# Patient Record
Sex: FEMALE | Race: BLACK OR AFRICAN AMERICAN | NOT HISPANIC OR LATINO | URBAN - METROPOLITAN AREA
[De-identification: names, ages, dates, MRNs, and addresses within clinical notes are randomized per-mention and may not be internally consistent; named-entity substitution may affect disease eponyms.]

---

## 2023-04-18 ENCOUNTER — OFFICE VISIT (OUTPATIENT)
Dept: PRIMARY CARE | Facility: CLINIC | Age: 33
End: 2023-04-18
Payer: COMMERCIAL

## 2023-04-18 VITALS
BODY MASS INDEX: 29.73 KG/M2 | WEIGHT: 185 LBS | OXYGEN SATURATION: 97 % | HEIGHT: 66 IN | DIASTOLIC BLOOD PRESSURE: 85 MMHG | SYSTOLIC BLOOD PRESSURE: 125 MMHG | HEART RATE: 82 BPM

## 2023-04-18 DIAGNOSIS — Z13.1 SCREENING FOR DIABETES MELLITUS: ICD-10-CM

## 2023-04-18 DIAGNOSIS — Z13.220 SCREENING FOR HYPERLIPIDEMIA: ICD-10-CM

## 2023-04-18 DIAGNOSIS — J45.30 MILD PERSISTENT ASTHMA WITHOUT COMPLICATION (HHS-HCC): ICD-10-CM

## 2023-04-18 DIAGNOSIS — Z13.21 ENCOUNTER FOR VITAMIN DEFICIENCY SCREENING: ICD-10-CM

## 2023-04-18 DIAGNOSIS — Z00.00 WELL ADULT EXAM: Primary | ICD-10-CM

## 2023-04-18 PROCEDURE — 99395 PREV VISIT EST AGE 18-39: CPT | Performed by: FAMILY MEDICINE

## 2023-04-18 PROCEDURE — 1036F TOBACCO NON-USER: CPT | Performed by: FAMILY MEDICINE

## 2023-04-18 RX ORDER — ALBUTEROL SULFATE 90 UG/1
2 AEROSOL, METERED RESPIRATORY (INHALATION) EVERY 6 HOURS PRN
Qty: 18 G | Refills: 2 | Status: SHIPPED | OUTPATIENT
Start: 2023-04-18 | End: 2024-01-29 | Stop reason: SDUPTHER

## 2023-04-18 RX ORDER — IBUPROFEN 800 MG/1
1 TABLET ORAL EVERY 8 HOURS PRN
COMMUNITY

## 2023-04-18 RX ORDER — ETONOGESTREL AND ETHINYL ESTRADIOL .12; .015 MG/D; MG/D
RING VAGINAL
COMMUNITY
End: 2023-04-18 | Stop reason: SDUPTHER

## 2023-04-18 RX ORDER — ETONOGESTREL AND ETHINYL ESTRADIOL .12; .015 MG/D; MG/D
1 RING VAGINAL
Qty: 3 EACH | Refills: 3 | Status: SHIPPED | OUTPATIENT
Start: 2023-04-18 | End: 2024-01-29 | Stop reason: SDUPTHER

## 2023-04-18 RX ORDER — ALBUTEROL SULFATE 90 UG/1
AEROSOL, METERED RESPIRATORY (INHALATION)
COMMUNITY
End: 2023-04-18 | Stop reason: SDUPTHER

## 2023-04-18 ASSESSMENT — PATIENT HEALTH QUESTIONNAIRE - PHQ9
SUM OF ALL RESPONSES TO PHQ9 QUESTIONS 1 AND 2: 0
1. LITTLE INTEREST OR PLEASURE IN DOING THINGS: NOT AT ALL
2. FEELING DOWN, DEPRESSED OR HOPELESS: NOT AT ALL

## 2023-04-18 NOTE — PROGRESS NOTES
"  Subjective   Patient ID: Charles Gilmore is a 32 y.o. female who presents for Annual Exam (Annual exam, last pap 3/2020/Patient requesting new birth control rx, last one was too expensive). She loves the nuvaring though.      Past Medical, Surgical, Social and Family Hx reviewed-Yes    Any acute complaints?    No    Any chronic issues addressed today or Rx refills needed?    Her asthma is flared up again recently due to moving back to [place].  Some days she is having chest tightness and wonders if she should talk about her inhaler.  Allegra usually helps but does not every time.  She is now using the albuterol daily since the weather changed.      Last pap 3 years ago  Last Mammogram N/A  Colon CA screening Hx N/A  Labs discussed  Up to date on immunizations yes, but needs TDaP  Dentist in the past year No    Menstruation No when using the ring  Sexual Activity No        PE:  /85   Pulse 82   Ht 1.664 m (5' 5.5\")   Wt 83.9 kg (185 lb)   LMP 03/28/2023   SpO2 97%   BMI 30.32 kg/m²   Alert adult woman, NAD  HEENT clear  Neck supple, No LAD  Heart RRR no murmur  Lungs CTA bilat  Abdomen benign, normal BS, soft NT/ND  Skin warm, dry, clear  Neuro grossly normal, gait WNL  Affect pleasant and appropriate, memory and judgement WNL      Assessment/Plan   Problem List Items Addressed This Visit    None  Visit Diagnoses       Well adult exam    -  Primary    Relevant Medications    EluRyng 0.12-0.015 mg/24 hr vaginal ring    Screening for hyperlipidemia        Relevant Orders    Lipid panel    Screening for diabetes mellitus        Relevant Orders    Hemoglobin A1C    Encounter for vitamin deficiency screening        Relevant Orders    Vitamin D, Total    Mild persistent asthma without complication        Relevant Medications    albuterol 90 mcg/actuation inhaler    budesonide (Pulmicort) 180 mcg/actuation inhaler               "

## 2023-04-19 ENCOUNTER — TELEPHONE (OUTPATIENT)
Dept: PRIMARY CARE | Facility: CLINIC | Age: 33
End: 2023-04-19
Payer: COMMERCIAL

## 2023-04-19 DIAGNOSIS — J45.30 MILD PERSISTENT ASTHMA WITHOUT COMPLICATION (HHS-HCC): Primary | ICD-10-CM

## 2023-04-19 NOTE — TELEPHONE ENCOUNTER
Patient was instructed to follow-up on medication that she received from her on appointment on April 18. Says that she was informed by her pharmacy that her inhaler isn't available in the generic form. States she isn't sure if there's an issue with [employer] specifically or if her PCP can off another alternative?

## 2023-04-20 NOTE — TELEPHONE ENCOUNTER
Called the patient to discuss which inhaler she needs. States she has her normal one for ProAir and will need the other one (PULMACORT?)

## 2023-04-21 RX ORDER — MOMETASONE FUROATE 220 UG/1
220 INHALANT RESPIRATORY (INHALATION) DAILY
Qty: 1 EACH | Refills: 2 | Status: SHIPPED | OUTPATIENT
Start: 2023-04-21 | End: 2024-01-29

## 2023-04-21 NOTE — TELEPHONE ENCOUNTER
Sent a new inhaler, if this one is not covered, she should contact her insurance to find out what alternatives might e covered.  Tisha Arenas MD

## 2023-05-02 PROBLEM — G44.201 INTRACTABLE TENSION-TYPE HEADACHE: Status: RESOLVED | Noted: 2023-05-02 | Resolved: 2023-05-02

## 2023-05-02 PROBLEM — N94.6 DYSMENORRHEA: Status: RESOLVED | Noted: 2023-05-02 | Resolved: 2023-05-02

## 2023-05-02 PROBLEM — J45.20 MILD INTERMITTENT ASTHMA (HHS-HCC): Status: ACTIVE | Noted: 2023-05-02

## 2023-05-05 ENCOUNTER — LAB (OUTPATIENT)
Dept: LAB | Facility: LAB | Age: 33
End: 2023-05-05
Payer: COMMERCIAL

## 2023-05-05 DIAGNOSIS — Z13.1 SCREENING FOR DIABETES MELLITUS: ICD-10-CM

## 2023-05-05 DIAGNOSIS — Z13.21 ENCOUNTER FOR VITAMIN DEFICIENCY SCREENING: ICD-10-CM

## 2023-05-05 DIAGNOSIS — Z13.220 SCREENING FOR HYPERLIPIDEMIA: ICD-10-CM

## 2023-05-05 LAB
CALCIDIOL (25 OH VITAMIN D3) (NG/ML) IN SER/PLAS: 29 NG/ML
CHOLESTEROL (MG/DL) IN SER/PLAS: 125 MG/DL (ref 0–199)
CHOLESTEROL IN HDL (MG/DL) IN SER/PLAS: 64.5 MG/DL
CHOLESTEROL/HDL RATIO: 1.9
ESTIMATED AVERAGE GLUCOSE FOR HBA1C: 77 MG/DL
HEMOGLOBIN A1C/HEMOGLOBIN TOTAL IN BLOOD: 4.3 %
LDL: 49 MG/DL (ref 0–99)
TRIGLYCERIDE (MG/DL) IN SER/PLAS: 56 MG/DL (ref 0–149)
VLDL: 11 MG/DL (ref 0–40)

## 2023-05-05 PROCEDURE — 80061 LIPID PANEL: CPT

## 2023-05-05 PROCEDURE — 36415 COLL VENOUS BLD VENIPUNCTURE: CPT

## 2023-05-05 PROCEDURE — 83036 HEMOGLOBIN GLYCOSYLATED A1C: CPT

## 2023-05-05 PROCEDURE — 82306 VITAMIN D 25 HYDROXY: CPT

## 2024-01-29 ENCOUNTER — OFFICE VISIT (OUTPATIENT)
Dept: PRIMARY CARE | Facility: CLINIC | Age: 34
End: 2024-01-29
Payer: COMMERCIAL

## 2024-01-29 VITALS
HEIGHT: 66 IN | DIASTOLIC BLOOD PRESSURE: 72 MMHG | BODY MASS INDEX: 32.62 KG/M2 | HEART RATE: 84 BPM | OXYGEN SATURATION: 98 % | WEIGHT: 203 LBS | SYSTOLIC BLOOD PRESSURE: 112 MMHG

## 2024-01-29 DIAGNOSIS — Z00.00 WELL ADULT EXAM: Primary | ICD-10-CM

## 2024-01-29 DIAGNOSIS — Z91.018 MULTIPLE FOOD ALLERGIES: ICD-10-CM

## 2024-01-29 DIAGNOSIS — J45.30 MILD PERSISTENT ASTHMA WITHOUT COMPLICATION (HHS-HCC): ICD-10-CM

## 2024-01-29 PROCEDURE — 99395 PREV VISIT EST AGE 18-39: CPT | Performed by: FAMILY MEDICINE

## 2024-01-29 PROCEDURE — 1036F TOBACCO NON-USER: CPT | Performed by: FAMILY MEDICINE

## 2024-01-29 PROCEDURE — 99212 OFFICE O/P EST SF 10 MIN: CPT | Performed by: FAMILY MEDICINE

## 2024-01-29 RX ORDER — ALBUTEROL SULFATE 90 UG/1
2 AEROSOL, METERED RESPIRATORY (INHALATION) EVERY 6 HOURS PRN
Qty: 18 G | Refills: 2 | Status: SHIPPED | OUTPATIENT
Start: 2024-01-29

## 2024-01-29 RX ORDER — BECLOMETHASONE DIPROPIONATE HFA 80 UG/1
160 AEROSOL, METERED RESPIRATORY (INHALATION) 2 TIMES DAILY
Qty: 10.6 G | Refills: 11 | Status: SHIPPED | OUTPATIENT
Start: 2024-01-29

## 2024-01-29 RX ORDER — ETONOGESTREL AND ETHINYL ESTRADIOL .12; .015 MG/D; MG/D
RING VAGINAL
Qty: 12 EACH | Refills: 3 | Status: SHIPPED | OUTPATIENT
Start: 2024-01-29

## 2024-01-29 RX ORDER — EPINEPHRINE 0.3 MG/.3ML
1 INJECTION SUBCUTANEOUS AS NEEDED
Qty: 2 EACH | Refills: 1 | Status: SHIPPED | OUTPATIENT
Start: 2024-01-29 | End: 2025-01-28

## 2024-01-29 ASSESSMENT — PATIENT HEALTH QUESTIONNAIRE - PHQ9
2. FEELING DOWN, DEPRESSED OR HOPELESS: NOT AT ALL
1. LITTLE INTEREST OR PLEASURE IN DOING THINGS: NOT AT ALL
SUM OF ALL RESPONSES TO PHQ9 QUESTIONS 1 AND 2: 0

## 2024-01-29 ASSESSMENT — PROMIS GLOBAL HEALTH SCALE
RATE_AVERAGE_PAIN: 0
RATE_GENERAL_HEALTH: GOOD
CARRYOUT_PHYSICAL_ACTIVITIES: COMPLETELY
EMOTIONAL_PROBLEMS: OFTEN
RATE_MENTAL_HEALTH: FAIR
RATE_PHYSICAL_HEALTH: POOR
RATE_QUALITY_OF_LIFE: GOOD
CARRYOUT_SOCIAL_ACTIVITIES: FAIR
RATE_SOCIAL_SATISFACTION: GOOD
RATE_AVERAGE_FATIGUE: MILD

## 2024-01-29 NOTE — PROGRESS NOTES
"  Subjective   Patient ID: Charles Gilmore \"Odilon" is a 33 y.o. female who presents for Annual Exam (Patient is here for annual physical. She would also like to discuss her inhaler alternatives. She would also like to discuss different birth control options and a referral for therapy. She would like an Epipen. ).  Inhaler issue is cost.  Birth control issue is mainly concerns about longterm use.        Past Medical, Surgical, Social and Family Hx reviewed-Yes    Any acute complaints?    No    Any chronic issues addressed today or Rx refills needed?        Last pap 3-4 years  Last Mammogram N/A  Colon CA screening Hx N/A  Labs 2023 were great  Up to date on immunizations no, needs to find out about TDaP  Dentist in the past year yes    Menstruation too much  Sexual Activity no concerns        PE:  /79   Pulse 84   Ht 1.681 m (5' 6.2\")   Wt 92.1 kg (203 lb)   LMP 01/29/2024   SpO2 98%   BMI 32.57 kg/m²   Alert adult woman, NAD  HEENT clear  Neck supple, No LAD  Heart RRR no murmur  Lungs CTA bilat  Abdomen benign, normal BS, soft NT/ND  Skin warm, dry, clear  Neuro grossly normal, gait WNL  Affect pleasant and appropriate, memory and judgement WNL        Assessment/Plan   Problem List Items Addressed This Visit    None  Visit Diagnoses         Codes    Well adult exam    -  Primary Z00.00    Relevant Medications    EluRyng 0.12-0.015 mg/24 hr vaginal ring    Multiple food allergies     Z91.018    Relevant Medications    EPINEPHrine (Epipen) 0.3 mg/0.3 mL injection syringe    Other Relevant Orders    Referral to Allergy    Mild persistent asthma without complication     J45.30    Relevant Medications    beclomethasone dipropionate (Qvar RediHaler) 80 mcg/actuation inhaler    albuterol 90 mcg/actuation inhaler               "

## 2024-04-04 ENCOUNTER — CONSULT (OUTPATIENT)
Dept: ALLERGY | Facility: CLINIC | Age: 34
End: 2024-04-04
Payer: COMMERCIAL

## 2024-04-04 VITALS
HEIGHT: 65 IN | HEART RATE: 73 BPM | OXYGEN SATURATION: 95 % | SYSTOLIC BLOOD PRESSURE: 128 MMHG | BODY MASS INDEX: 33.15 KG/M2 | DIASTOLIC BLOOD PRESSURE: 81 MMHG | WEIGHT: 199 LBS | TEMPERATURE: 98 F

## 2024-04-04 DIAGNOSIS — J30.81 ALLERGIC RHINITIS DUE TO ANIMAL HAIR AND DANDER: ICD-10-CM

## 2024-04-04 DIAGNOSIS — J30.1 SEASONAL ALLERGIC RHINITIS DUE TO POLLEN: Primary | ICD-10-CM

## 2024-04-04 DIAGNOSIS — H10.13 ALLERGIC CONJUNCTIVITIS OF BOTH EYES: ICD-10-CM

## 2024-04-04 DIAGNOSIS — J30.89 ALLERGIC RHINITIS DUE TO DUST MITE: ICD-10-CM

## 2024-04-04 DIAGNOSIS — J45.40 MODERATE PERSISTENT ASTHMA WITHOUT COMPLICATION (HHS-HCC): ICD-10-CM

## 2024-04-04 DIAGNOSIS — T78.1XXA ADVERSE FOOD REACTION, INITIAL ENCOUNTER: ICD-10-CM

## 2024-04-04 PROCEDURE — 99204 OFFICE O/P NEW MOD 45 MIN: CPT | Performed by: ALLERGY & IMMUNOLOGY

## 2024-04-04 PROCEDURE — 94010 BREATHING CAPACITY TEST: CPT | Performed by: ALLERGY & IMMUNOLOGY

## 2024-04-04 PROCEDURE — 95004 PERQ TESTS W/ALRGNC XTRCS: CPT | Performed by: ALLERGY & IMMUNOLOGY

## 2024-04-04 RX ORDER — OLOPATADINE HYDROCHLORIDE 2 MG/ML
1 SOLUTION/ DROPS OPHTHALMIC DAILY PRN
Qty: 2.5 ML | Refills: 5 | Status: SHIPPED | OUTPATIENT
Start: 2024-04-04 | End: 2025-04-04

## 2024-04-04 RX ORDER — MINERAL OIL
180 ENEMA (ML) RECTAL DAILY PRN
Qty: 30 TABLET | Refills: 11 | Status: SHIPPED | OUTPATIENT
Start: 2024-04-04 | End: 2025-04-04

## 2024-04-04 RX ORDER — MOMETASONE FUROATE 50 UG/1
2 SPRAY, METERED NASAL 2 TIMES DAILY
Qty: 17 G | Refills: 11 | Status: SHIPPED | OUTPATIENT
Start: 2024-04-04 | End: 2025-04-04

## 2024-04-04 ASSESSMENT — ENCOUNTER SYMPTOMS
MUSCULOSKELETAL NEGATIVE: 1
HEMATOLOGIC/LYMPHATIC NEGATIVE: 1
CARDIOVASCULAR NEGATIVE: 1
CONSTITUTIONAL NEGATIVE: 1
ALLERGIC/IMMUNOLOGIC NEGATIVE: 1
GASTROINTESTINAL NEGATIVE: 1
EYES NEGATIVE: 1
RESPIRATORY NEGATIVE: 1

## 2024-04-04 ASSESSMENT — PAIN SCALES - GENERAL: PAINLEVEL: 0-NO PAIN

## 2024-04-04 NOTE — PROGRESS NOTES
Charles Gilmore presents for initial evaluation today.      Charles Gilmore was seen at the request of Tisha Arenas MD for a chief complaint of allergies; a report with my findings is being sent via written or electronic means to Tisha Arenas MD with my recommendations for treatment    She provides the following history:  She has had seasonal allergies since being an adult. She moved from [place] to [place] and then back to [place] 2 years ago. She notes that she gets  itchy eyes, runny nose, sneezing, nasal congestion in the spring through fall and with exposure to cat and dogs.  The symptoms have been worse since moving back to [place].  The house that she lives in had animals previously.  She uses Allegra, Zyrtec, Pataday.      Asthma:  She was diagnosed with asthma at age 2.  She was on Asmanex and recently switched to Qvar due to insurance.  She rarely uses her rescue Albuterol.  She denies nocturnal symptoms.     Eczema: She reports mild eczema controlled with moisturizers    Food allergy: Avocado- used an avocado hair mask at age 17, felt like she had a chemical burn on scalp.  If she tastes avocado, her mouth itches so she avoids it.  Eating a meal cooked with avocado oil made her feel like she had chest burning; Pineapple, watermelon, kiwi, fresh tomato make her mouth itch.      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    HENT: Negative.     Eyes: Negative.    Respiratory: Negative.     Cardiovascular:  "Negative.    Gastrointestinal: Negative.    Musculoskeletal: Negative.    Skin: Negative.    Allergic/Immunologic: Negative.    Hematological: Negative.        Vital signs:  /81   Pulse 73   Temp 36.7 °C (98 °F)   Ht 1.651 m (5' 5\")   Wt 90.3 kg (199 lb)   SpO2 95%   BMI 33.12 kg/m²     Physical Exam:  GENERAL: Alert, oriented and in no acute distress.     HEENT: EYES: No conjunctival injection or cobblestoning. Nose: nasal turbinates mildly edematous and are not boggy.  There is no mucous stranding, polyps, or blood    noted. EARS: Tympanic membranes are clear. MOUTH: moist and pink with no exudates, ulcers, or thrush. NECK: is supple, without adenopathy.  No upper airway stridor noted.       HEART: regular rate and rhythm.       LUNGS: Clear to auscultation bilaterally. No wheezing, rhonchi or rales.        ABDOMEN: Positive bowel sounds, soft, nontender, nondistended.       EXTREMITIES: No clubbing or edema.        NEURO:  Normal affect.  Gait normal.      SKIN: No rash, hives, or angioedema noted      Environmental Allergy Testing:  Test Results (wheal/flare in mm)    Controls  Controls  Histamine: 8x30  Negative: 0x0    Trees:  Trees  American Beech: 4x20  Darrel: 0x0  Birch: 0x0  Black [place]: 4x20  [place]: 0x0  [place]: 0x0  [place]astern [place]: 0x0  Elm: 4x20  [place]: 8x30  Maple: 3x6  [place]: 0x0  [place]: 3x20  [place]: 3x25  White poplar: 0x0     Grasses:  Grass  B[place]ia: 0x0  [place]: 3x25  [place]: 0x0  KORT Grass Mix: 0x0  Sweet Vernal: 0x0    Weeds:  Weeds  Cocklebur: 0x0  Dandelion: 0x0  English Plantain: 0x0  Goldenrod: 0x0  Lambs Quarters: 0x0  Mugwort: 0x0  Pigweed: 3x25  Ragweed Mix: 0x0  Russian Thistle: 0x0  Yellow Dock: 0x0     Molds:  Molds  Alternaria: 0x0  Aspergillus: 0x0  Cladosporium: 0x0  Helminthosporium: 0x0  Penicillium: 0x0  Stemphyllium: 0x0    Animals/Dust Mite:  Animals  Cat: 4x30  AP dog: 4x25  Ruelas Dog: 0x0  Mouse: 0x0  Cockroach: 0x0  Dust Mite F: 4x35  Dust Mite P: " 3x20     Food Allergy Test Results     Fruits-Other  Avocado: 0x0    Office spirometry:  FEV1: 116% predicted  FVC:  115% predicted  FEV1/FVC: 0.842  Inspiratory loops: mild flattening of inspiratory loop  Post-bronchodilator assessment: No    Impression:  1. Seasonal allergic rhinitis due to pollen    2. Adverse food reaction, initial encounter    3. Allergic rhinitis due to animal hair and dander    4. Allergic rhinitis due to dust mite    5. Moderate persistent asthma without complication      Assessment and Plan:  Allergic rhinitis, seasonal and perennial: She was found to have significant sensitivity to tree pollen, weed pollen, cat, dog, dust mite on aeroallergen skin prick testing today.  We discussed treatments for allergic rhinitis to include avoidance, medication, and allergen immunotherapy.  We discussed interventions for dust mite control, and provided  information on dust mite encasements for the bedding.  Recommend Nasonex 2 sprays each nostril once daily, and fexofenadine 180 mg once daily as needed.  We reviewed proper nasal spray administration technique.  If she does not achieve adequate control with medication and/or would like to decrease overall medication use, allergen immunotherapy would be an option to consider in the future.    Asthma, moderate persistent: Well-controlled on Qvar 2 puffs once daily and as needed albuterol.  Office spirometry today shows FEV1 116% predicted.  Continue current therapy.    Adverse reaction to food, initial encounter: Avocado allergy skin prick testing is negative today.  If has continued symptoms with exposure, could consider prick-prick skin testing with fresh avocado.  We discussed that her oral itching with certain fresh fruits and vegetables is likely due to oral allergy syndrome (pollen-food cross-reactivity).    She will be moving to [place] this summer, and may follow-up with our office as needed

## 2024-04-04 NOTE — PATIENT INSTRUCTIONS
It was nice to meet you today    Your skin testing today was positive to tree and weed pollen, cat, dog, dust mite.  Please see below for environmental allergen avoidance recommendations.     Environmental Allergy Testing:  Test Results (wheal/flare in mm)    Controls  Controls  Histamine: 8x30  Negative: 0x0    Trees:  Trees  American Beech: 4x20  Darrel: 0x0  Birch: 0x0  Black [place]: 4x20  [place]: 0x0  [place]: 0x0  Eastern [place]: 0x0  Elm: 4x20  [place]: 8x30  Maple: 3x6  [place]: 0x0  [place]: 3x20  [place]: 3x25  White poplar: 0x0     Grasses:  Grass  B[place]ia: 0x0  [place]: 3x25  [place]: 0x0  KORT Grass Mix: 0x0  Sweet Vernal: 0x0    Weeds:  Weeds  Cocklebur: 0x0  Dandelion: 0x0  English Plantain: 0x0  Goldenrod: 0x0  Lambs Quarters: 0x0  Mugwort: 0x0  Pigweed: 3x25  Ragweed Mix: 0x0  Russian Thistle: 0x0  Yellow Dock: 0x0     Molds:  Molds  Alternaria: 0x0  Aspergillus: 0x0  Cladosporium: 0x0  Helminthosporium: 0x0  Penicillium: 0x0  Stemphyllium: 0x0    Animals/Dust Mite:  Animals  Cat: 4x30  AP dog: 4x25  Ruelas Dog: 0x0  Mouse: 0x0  Cockroach: 0x0  Dust Mite F: 4x35  Dust Mite P: 3x20      You may use Nasonex (mometasone) 2 sprays each nostril once daily    Technique for nasal spray administration:  First, look slightly down, breathe normally, you do not need to sniff while you spray.  To use the nose spray, place the tip in your nose with the opposite hand (left hand, right side of nose, right hand, left side of nose), and aim or point toward the outside of the nose.  Do not sniff or snort medication afterwards as this can cause most of the medication to be swallowed.  Rather, dab your nose with a tissue if any runs out.    Start Allegra (Fexofenadine) 180 mg once daily      You may use olopatadine 0.2% eyedrops 1 drop each eye daily as needed      Thank you for your visit to the [facility].  Part of a successful visit is taking home the  information you learned today and using it to make things better.  These take home instructions are to help you, if you have questions or concerns, please contact us.     Please see below for recommendations on environmental allergy control or modification:     Pollen Avoidance--If you are sensitive to pollen, there are a few tips to help limit, but          not avoid, exposure.     Minimize outdoor activity between 5am-10am, pollen levels are highest at this time.       Keep car windows closed when traveling.     Close house windows at night, use the air conditioning if necessary.     Check the pollen count to know what pollen is outside (http://aaaai.org/nab).       , Pet Avoidance     Keep pets out of the bedroom at all times.     Keep pets off of furniture and other surfaces like counter tops.     More frequent bathing can help      Groom your pet outside so hair and dander stay outside the home when brushed.     Consider using HEPA air purifier in bedroom    , and Dust mite control.            1.  Dust mite proof covers/encasing on the mattress, pillow and box spring.            2.  Wash sheets and bed linens in hot water each week.          3.  Vacuum carpets in bedroom each week.          4.  Dust or wipe down any hard surfaces.          5.  Avoid using a humidifier in the bedroom      Limit Cigarette smoke exposure.  Smoking and second hand smoke can irritate the          nose and sinuses.  You and the people around you will benefit from avoiding          cigarette smoke.

## 2024-09-08 ENCOUNTER — HOSPITAL ENCOUNTER (EMERGENCY)
Facility: HOSPITAL | Age: 34
Discharge: HOME | End: 2024-09-08
Attending: EMERGENCY MEDICINE
Payer: COMMERCIAL

## 2024-09-08 ENCOUNTER — APPOINTMENT (OUTPATIENT)
Dept: RADIOLOGY | Facility: HOSPITAL | Age: 34
End: 2024-09-08
Payer: COMMERCIAL

## 2024-09-08 VITALS
HEIGHT: 67 IN | RESPIRATION RATE: 18 BRPM | WEIGHT: 198 LBS | DIASTOLIC BLOOD PRESSURE: 92 MMHG | HEART RATE: 81 BPM | SYSTOLIC BLOOD PRESSURE: 143 MMHG | TEMPERATURE: 98.1 F | BODY MASS INDEX: 31.08 KG/M2 | OXYGEN SATURATION: 98 %

## 2024-09-08 DIAGNOSIS — K80.20 GALL BLADDER STONES: Primary | ICD-10-CM

## 2024-09-08 LAB
ALBUMIN SERPL BCP-MCNC: 3.8 G/DL (ref 3.4–5)
ALP SERPL-CCNC: 71 U/L (ref 33–110)
ALT SERPL W P-5'-P-CCNC: 11 U/L (ref 7–45)
ANION GAP SERPL CALC-SCNC: 10 MMOL/L (ref 10–20)
APPEARANCE UR: CLEAR
AST SERPL W P-5'-P-CCNC: 14 U/L (ref 9–39)
B-HCG SERPL-ACNC: <2 MIU/ML
BACTERIA #/AREA URNS AUTO: ABNORMAL /HPF
BASOPHILS # BLD AUTO: 0.04 X10*3/UL (ref 0–0.1)
BASOPHILS NFR BLD AUTO: 0.9 %
BILIRUB DIRECT SERPL-MCNC: 0.2 MG/DL (ref 0–0.3)
BILIRUB SERPL-MCNC: 0.9 MG/DL (ref 0–1.2)
BILIRUB UR STRIP.AUTO-MCNC: NEGATIVE MG/DL
BUN SERPL-MCNC: 11 MG/DL (ref 6–23)
CALCIUM SERPL-MCNC: 9.1 MG/DL (ref 8.6–10.3)
CHLORIDE SERPL-SCNC: 103 MMOL/L (ref 98–107)
CO2 SERPL-SCNC: 26 MMOL/L (ref 21–32)
COLOR UR: COLORLESS
CREAT SERPL-MCNC: 0.86 MG/DL (ref 0.5–1.05)
EGFRCR SERPLBLD CKD-EPI 2021: 90 ML/MIN/1.73M*2
EOSINOPHIL # BLD AUTO: 0.06 X10*3/UL (ref 0–0.7)
EOSINOPHIL NFR BLD AUTO: 1.3 %
ERYTHROCYTE [DISTWIDTH] IN BLOOD BY AUTOMATED COUNT: 12.3 % (ref 11.5–14.5)
GLUCOSE SERPL-MCNC: 82 MG/DL (ref 74–99)
GLUCOSE UR STRIP.AUTO-MCNC: NORMAL MG/DL
HCT VFR BLD AUTO: 39.8 % (ref 36–46)
HGB BLD-MCNC: 12.8 G/DL (ref 12–16)
IMM GRANULOCYTES # BLD AUTO: 0.03 X10*3/UL (ref 0–0.7)
IMM GRANULOCYTES NFR BLD AUTO: 0.6 % (ref 0–0.9)
KETONES UR STRIP.AUTO-MCNC: NEGATIVE MG/DL
LACTATE SERPL-SCNC: 0.6 MMOL/L (ref 0.4–2)
LEUKOCYTE ESTERASE UR QL STRIP.AUTO: ABNORMAL
LYMPHOCYTES # BLD AUTO: 1.72 X10*3/UL (ref 1.2–4.8)
LYMPHOCYTES NFR BLD AUTO: 37 %
MCH RBC QN AUTO: 27.1 PG (ref 26–34)
MCHC RBC AUTO-ENTMCNC: 32.2 G/DL (ref 32–36)
MCV RBC AUTO: 84 FL (ref 80–100)
MONOCYTES # BLD AUTO: 0.26 X10*3/UL (ref 0.1–1)
MONOCYTES NFR BLD AUTO: 5.6 %
NEUTROPHILS # BLD AUTO: 2.54 X10*3/UL (ref 1.2–7.7)
NEUTROPHILS NFR BLD AUTO: 54.6 %
NITRITE UR QL STRIP.AUTO: NEGATIVE
NRBC BLD-RTO: 0 /100 WBCS (ref 0–0)
PH UR STRIP.AUTO: 6.5 [PH]
PLATELET # BLD AUTO: 223 X10*3/UL (ref 150–450)
POTASSIUM SERPL-SCNC: 3.8 MMOL/L (ref 3.5–5.3)
PROT SERPL-MCNC: 6.8 G/DL (ref 6.4–8.2)
PROT UR STRIP.AUTO-MCNC: NEGATIVE MG/DL
RBC # BLD AUTO: 4.73 X10*6/UL (ref 4–5.2)
RBC # UR STRIP.AUTO: NEGATIVE /UL
RBC #/AREA URNS AUTO: ABNORMAL /HPF
SODIUM SERPL-SCNC: 135 MMOL/L (ref 136–145)
SP GR UR STRIP.AUTO: 1.01
SQUAMOUS #/AREA URNS AUTO: ABNORMAL /HPF
UROBILINOGEN UR STRIP.AUTO-MCNC: NORMAL MG/DL
WBC # BLD AUTO: 4.7 X10*3/UL (ref 4.4–11.3)
WBC #/AREA URNS AUTO: ABNORMAL /HPF

## 2024-09-08 PROCEDURE — 74177 CT ABD & PELVIS W/CONTRAST: CPT | Performed by: STUDENT IN AN ORGANIZED HEALTH CARE EDUCATION/TRAINING PROGRAM

## 2024-09-08 PROCEDURE — 96361 HYDRATE IV INFUSION ADD-ON: CPT

## 2024-09-08 PROCEDURE — 36415 COLL VENOUS BLD VENIPUNCTURE: CPT | Performed by: EMERGENCY MEDICINE

## 2024-09-08 PROCEDURE — 82248 BILIRUBIN DIRECT: CPT | Performed by: EMERGENCY MEDICINE

## 2024-09-08 PROCEDURE — 99284 EMERGENCY DEPT VISIT MOD MDM: CPT | Mod: 25

## 2024-09-08 PROCEDURE — 87086 URINE CULTURE/COLONY COUNT: CPT | Mod: AHULAB | Performed by: EMERGENCY MEDICINE

## 2024-09-08 PROCEDURE — 96374 THER/PROPH/DIAG INJ IV PUSH: CPT

## 2024-09-08 PROCEDURE — 83605 ASSAY OF LACTIC ACID: CPT | Performed by: EMERGENCY MEDICINE

## 2024-09-08 PROCEDURE — 81001 URINALYSIS AUTO W/SCOPE: CPT | Performed by: EMERGENCY MEDICINE

## 2024-09-08 PROCEDURE — 2500000004 HC RX 250 GENERAL PHARMACY W/ HCPCS (ALT 636 FOR OP/ED): Performed by: EMERGENCY MEDICINE

## 2024-09-08 PROCEDURE — 74177 CT ABD & PELVIS W/CONTRAST: CPT

## 2024-09-08 PROCEDURE — 85025 COMPLETE CBC W/AUTO DIFF WBC: CPT | Performed by: EMERGENCY MEDICINE

## 2024-09-08 PROCEDURE — 96375 TX/PRO/DX INJ NEW DRUG ADDON: CPT

## 2024-09-08 PROCEDURE — 80048 BASIC METABOLIC PNL TOTAL CA: CPT | Performed by: EMERGENCY MEDICINE

## 2024-09-08 PROCEDURE — 84702 CHORIONIC GONADOTROPIN TEST: CPT | Performed by: EMERGENCY MEDICINE

## 2024-09-08 PROCEDURE — 2550000001 HC RX 255 CONTRASTS: Performed by: EMERGENCY MEDICINE

## 2024-09-08 RX ORDER — KETOROLAC TROMETHAMINE 30 MG/ML
30 INJECTION, SOLUTION INTRAMUSCULAR; INTRAVENOUS ONCE
Status: COMPLETED | OUTPATIENT
Start: 2024-09-08 | End: 2024-09-08

## 2024-09-08 RX ORDER — ONDANSETRON HYDROCHLORIDE 2 MG/ML
4 INJECTION, SOLUTION INTRAVENOUS ONCE
Status: COMPLETED | OUTPATIENT
Start: 2024-09-08 | End: 2024-09-08

## 2024-09-08 ASSESSMENT — COLUMBIA-SUICIDE SEVERITY RATING SCALE - C-SSRS
1. IN THE PAST MONTH, HAVE YOU WISHED YOU WERE DEAD OR WISHED YOU COULD GO TO SLEEP AND NOT WAKE UP?: NO
6. HAVE YOU EVER DONE ANYTHING, STARTED TO DO ANYTHING, OR PREPARED TO DO ANYTHING TO END YOUR LIFE?: NO
2. HAVE YOU ACTUALLY HAD ANY THOUGHTS OF KILLING YOURSELF?: NO

## 2024-09-08 NOTE — ED TRIAGE NOTES
PT TO ED WITH C/O ABD PAIN/ STATES IT STARTS UNDER RIGHT RIB AND RADIATES DOWN TO LOWER ABD. PT HAD APPENDIX REMOVED. STATES ABD PAIN STARTED AT 3PM AND IT SUBSIDED BUT CAME BACK AN HOUR LATER. DENIES CP/SOB

## 2024-09-09 NOTE — ED PROVIDER NOTES
HPI   Chief Complaint   Patient presents with    Abdominal Pain       HPI: []  33-year-old female no medical history status post appendectomy comes with abdominal discomfort.  He states that for the last 2 days he developed dull pain.  Which is localized right upper quadrant and lower abdomen.  Comes and goes.  She has associated nausea no vomiting no diarrhea constipation no fever no chills no back pain no flank pain no urinary frequency urgency hematuria.  No recent travel hospitalization.  No vaginal spotting bleeding or discharge.  Not pregnant.    Past history: Appendectomy  Social: Patient denies current tobacco alcohol drug abuse.  REVIEW OF SYSTEMS:    GENERAL.: No weight loss, fatigue, anorexia, insomnia, fever.    EYES: No vision loss, double vision, drainage, eye pain.    ENT: No pharyngitis, dry mouth.    CARDIOPULMONARY: No chest pain, palpitations, syncope, near syncope. No shortness of breath, cough, hemoptysis.    GI: Positive for abdominal pain, change in bowel habits, melena, hematemesis, hematochezia, nausea, vomiting, diarrhea.    : No discharge, dysuria, frequency, urgency, hematuria.    MS: No limb pain, joint pain, joint swelling.    SKIN: No rashes.    PSYCH: No depression, anxiety, suicidality, homicidality.    Review of systems is otherwise negative unless stated above or in history of present illness.  Social history, family history, allergies reviewed.  PHYSICAL EXAM:    GENERAL: Vitals noted, no distress. Alert and oriented  x 3. Non-toxic.      EENT: TMs clear. Posterior oropharynx unremarkable. No meningismus. No LAD.     NECK: Supple. Nontender. No midline tenderness.     CARDIAC: Regular, rate, rhythm. No murmurs rubs or gallops. No JVD    PULMONARY: Lungs clear bilaterally with good aeration. No wheezes rales or rhonchi. No respiratory distress.     ABDOMEN: Soft, nonsurgical.  Tender right upper quadrant no rebound or guarding negative CVA tenderness negative inguinal hernias. No  peritoneal signs. Normoactive bowel sounds. No pulsatile masses.     EXTREMITIES: No peripheral edema. Negative Homans bilaterally, no cords.  2+ bounding pulses well-perfused.    SKIN: No rash. Intact.     NEURO: No focal neurologic deficits, NIH score of 0. Cranial nerves normal as tested from II through XII.     MEDICAL DECISION MAKING:  CBC with differential chemistries liver functions lactate urinalysis negative pregnancy negative.  Abdominal CAT scan shows a stones in the gallbladder no cholecystitis or choledocholithiasis, and mild inflammation terminal ileum.    Treatment in the ED: IV established given IV fluids intravenous ketorolac and Zofran.    ED course: Patient made aware of the abnormal findings on repeat exam she has a very benign abdominal examination with no signs of cholecystitis    Impression: #1 abdominal pain, #2 gallbladder disease, #3 terminal ileitis    Plan/MDM: 33-year-old female status post appendectomy comes in with what appears to be biliary colic with possible terminal ileitis leukocytosis choledocholithiasis or cholecystitis or colitis, patient currently is symptom-free will be discharged home has no fever no leukocytosis normal liver functions advised outpatient follow-up with primary doctor and general surgery for further evaluation and care with strict and precaution.              Patient History   Past Medical History:   Diagnosis Date    Dysmenorrhea 05/02/2023     Past Surgical History:   Procedure Laterality Date    OTHER SURGICAL HISTORY  01/25/2022    Dermatological surgery     Family History   Problem Relation Name Age of Onset    Hypertension Mother      No Known Problems Father      No Known Problems Sister      No Known Problems Sister      No Known Problems Brother      No Known Problems Son       Social History     Tobacco Use    Smoking status: Never    Smokeless tobacco: Never   Substance Use Topics    Alcohol use: Yes    Drug use: Not Currently       Physical Exam    ED Triage Vitals [09/08/24 1741]   Temperature Heart Rate Respirations BP   36.7 °C (98.1 °F) 81 18 147/85      Pulse Ox Temp src Heart Rate Source Patient Position   100 % -- -- --      BP Location FiO2 (%)     -- --       Physical Exam      ED Course & [facility]   ED Course as of 09/08/24 2102   Sun Sep 08, 2024 2100 Patient's laboratory workup is fairly reassuring CBC with differential chemistry liver functions lactate is normal.  Abdominal CAT scan shows a biliary gallbladder disease/stones and mild inflammation terminal ileum on exam patient has no pain or tenderness in the Periumbilical or right lower quadrant.,  Patient may have the abnormal findings of the CAT scan including the gallstone patient patient currently had no signs of cholecystitis or pancreatitis or choledocholithiasis normal LFTs will be discharged home with an outpatient follow with her primary doctor and general surgery with strict return precaution. [MT]      ED Course User Index  [MT] Gregg Christensen MD         Diagnoses as of 09/08/24 2102   Gall bladder stones                 No data recorded     [place] Coma Scale Score: 15 (09/08/24 1800 : Stephanie Ortiz, HEATHER)                           Medical Decision Making      Procedure  Procedures     Gregg Christensen MD  09/08/24 2104

## 2024-09-10 LAB — BACTERIA UR CULT: NO GROWTH

## 2024-09-10 NOTE — PROGRESS NOTES
Charles Gilmore  [id number]   09/11/24  10:20 AM    HPI/Subjective:  Charles Gilmore is a 33 y.o. female with history of asthma, appendectomy who is referred to clinic by Gregg Christensen MD for evaluation of possible symptomatic gallstones.    Symptomatically, this patient experiences lower abdominal pain - first noticed last month happening a couple times a week and now seems more frequent over the last couple of days. Will come and go and last a few minutes and typically go away. Primarily lower abdominal pain. Does not radiate to back or shoulder. Maybe sometimes umbilicus. No fevers or chills. Is a vegetarian, has bowel movements with intervals of 2-4 days between. No blood. Usually well formed. In general doesn't have significant pain with periods, and doesn't generally feel like this. However, does note that this feels like period cramps that she had prior to having her son and being on birth control.    They have had  one  presentation(s) to the hospital for these symptoms. She presented because the pain lasted longer than normal. It was primarily lower abdominal, but a little RUQ and chest. Had some nausea, but no change in bowel habits. This was the only time she's had nausea, which she felt was secondary to pain.     Workup has included labs notable for normal WBC, normal bilirubin, normal alk phos, and unchecked lipase, and CT Scan: notable for mild intrahepatic dilation, cholelithiasis, no overt cholecystitis . They also noted possible terminal ileal thickening and left sided ovarian vein prominence with question of pelvic congestion.    Past surgical history is otherwise notable for lap appendectomy. Not sure of any family history of IBD.     Review of systems is as above, otherwise negative.    Current Outpatient Medications   Medication Instructions    albuterol 90 mcg/actuation inhaler 2 puffs, inhalation, Every 6 hours PRN    beclomethasone dipropionate (Qvar RediHaler) 80 mcg/actuation inhaler 2  Inhalations, inhalation, 2 times daily, Rinse mouth with water after use to reduce aftertaste and incidence of candidiasis. Do not swallow.    EluRyng 0.12-0.015 mg/24 hr vaginal ring Insert vaginally and leave in place for 3 consecutive weeks, Change every 3 weeks and leave out for 7 days every 12 weeks.    EPINEPHrine (EPIPEN) 0.3 mg, intramuscular, As needed, Call 911 after use.    fexofenadine (ALLEGRA) 180 mg, oral, Daily PRN    ibuprofen 800 mg tablet 1 tablet, oral, Every 8 hours PRN    mometasone (Nasonex) 50 mcg/actuation nasal spray 2 sprays, Each Nostril, 2 times daily    olopatadine (Pataday) 0.2 % ophthalmic solution 1 drop, ophthalmic (eye), Daily PRN     Allergies   Allergen Reactions    Avocado Hives     Skin burning after she put it on her hair    Pineapple Other    Watermelon Other       Objective:  Vitals:    09/11/24 1003   BP: 110/70   Pulse: 87   Resp: 16     Body mass index is 30.7 kg/m².  Physical Exam  Constitutional:       Appearance: Normal appearance.   HENT:      Head: Normocephalic and atraumatic.      Mouth/Throat:      Mouth: Mucous membranes are moist.   Eyes:      Extraocular Movements: Extraocular movements intact.      Pupils: Pupils are equal, round, and reactive to light.   Cardiovascular:      Rate and Rhythm: Normal rate and regular rhythm.   Pulmonary:      Effort: Pulmonary effort is normal.   Abdominal:      General: Abdomen is flat.      Palpations: Abdomen is soft.      Tenderness: There is abdominal tenderness.      Comments: Mild tenderness in lower abdomen and epigastrum   Musculoskeletal:         General: Normal range of motion.      Cervical back: Normal range of motion.   Skin:     General: Skin is warm and dry.   Neurological:      General: No focal deficit present.      Mental Status: She is alert and oriented to person, place, and time.   Psychiatric:         Mood and Affect: Mood normal.         Behavior: Behavior normal.         Thought Content: Thought content  normal.         Imaging consisting of CT abdomen pelvis from 9/8 was reviewed personally and was notable for gallstones and intrahepatic biliary dilatation .    Official radiology read:  IMPRESSION:  1. Mild prominence of the intrahepatic biliary tree with several  radiolucent stones visualized in the gallbladder, without evidence of  gallbladder wall thickening, pericholecystic stranding or dilatation  of the common bile duct. Correlate with biliary colic and ultrasound  can be considered for further characterization.  2. Slight mucosal prominence in the terminal ileum without bobbi  inflammatory wall thickening or surrounding mesenteric stranding.  Appendix is surgically absent.  3. Prominent asymmetric veins are present in the left adnexa  surrounding the left ovary, with mild asymmetric enlargement of the  left gonadal vein more proximally, without evidence of obstruction or  thrombus. Finding is of unclear clinical etiology and correlate with  any pelvic congestion symptoms.    Assessment/Plan:  Charles Gilmore is a 33 y.o. female with history of asthma, lap appendectomy who presents with symptoms predominantly of lower abdominal pain over the last month and workup demonstrating gallstones without gallbladder inflammation, terminal ileal mucosal prominence, and prominent asymmetric left ovarian veins.     We will plan to refer the patient for assessment by GI and gynecology . Her symptoms as reported at this visit are not typical for symptomatic gallstones and she does have abnormal imaging findings that could suggest IBD or pelvic congestion as the etiology of her symptoms which is primarily transient lower abdominal pain. We discussed options of performing a cholecystectomy vs referral to other specialists for evaluation first, and because these symptoms are not classic for biliary colic and there are other disease processes on the differential suggested by imaging findings, I think it would be reasonable to  undergo evaluation by other specialists first.    If she undergoes evaluation that rules out alternative etiologies of her pain, I would offer cholecystectomy as a diagnostic and potentially therapeutic option. I also discussed with her that if her symptoms become more classic of biliary colic over time even prior to specialist evaluation, I would offer cholecystectomy in that scenario as well.    I will see the patient back in the office in about 2 months for symptom reevaluation and follow up of specialist evaluation or sooner if needed.    Thuan Sanchez MD  Assistant Professor of Surgery  Division of General Surgery  Department of Surgery

## 2024-09-11 ENCOUNTER — APPOINTMENT (OUTPATIENT)
Dept: SURGERY | Facility: CLINIC | Age: 34
End: 2024-09-11
Payer: COMMERCIAL

## 2024-09-11 VITALS
SYSTOLIC BLOOD PRESSURE: 110 MMHG | HEART RATE: 87 BPM | DIASTOLIC BLOOD PRESSURE: 70 MMHG | BODY MASS INDEX: 30.76 KG/M2 | RESPIRATION RATE: 16 BRPM | HEIGHT: 67 IN | WEIGHT: 196 LBS

## 2024-09-11 DIAGNOSIS — R10.30 LOWER ABDOMINAL PAIN OF UNKNOWN ETIOLOGY: Primary | ICD-10-CM

## 2024-09-11 DIAGNOSIS — N94.89 PELVIC CONGESTION: ICD-10-CM

## 2024-09-11 DIAGNOSIS — R93.3 ABNORMAL COMPUTED TOMOGRAPHY OF CECUM AND TERMINAL ILEUM: ICD-10-CM

## 2024-09-11 DIAGNOSIS — K80.20 GALL BLADDER STONES: ICD-10-CM

## 2024-09-11 PROCEDURE — 3008F BODY MASS INDEX DOCD: CPT | Performed by: SURGERY

## 2024-09-11 PROCEDURE — 99204 OFFICE O/P NEW MOD 45 MIN: CPT | Performed by: SURGERY

## 2024-09-11 ASSESSMENT — PAIN SCALES - GENERAL: PAINLEVEL: 0-NO PAIN

## 2024-09-11 NOTE — Clinical Note
Hi there,  I'm a new general surgeon at . I saw Ms. Gilmore today. She's been having intermittent predominantly lower abdominal pain for the last month or so. She had gone to the ED and was found to have gallstones (for which she was sent to me), but also had some mucosal enhancement of her terminal ileum and some suggestion of possible pelvic congestion. We talked about options including lap [place] but because her symptoms are atypical of gallstone disease, I'm going to refer to GI and GYN before doing anything else. If their evaluation is negative and/or her symptoms become more classic of gallstone disease, I'd be happy to take her gallbladder out. Just wanted to keep you in the loop.  Please let me know if I can be of any other help to you or this patient. I specialize in complex hernia repair and would be happy to see any other patients you have that might have hernias. Thank you!  Frederick

## 2024-09-26 ENCOUNTER — APPOINTMENT (OUTPATIENT)
Dept: OBSTETRICS AND GYNECOLOGY | Facility: CLINIC | Age: 34
End: 2024-09-26
Payer: COMMERCIAL

## 2024-10-03 ENCOUNTER — OFFICE VISIT (OUTPATIENT)
Dept: GASTROENTEROLOGY | Facility: CLINIC | Age: 34
End: 2024-10-03
Payer: COMMERCIAL

## 2024-10-03 VITALS
HEART RATE: 71 BPM | DIASTOLIC BLOOD PRESSURE: 70 MMHG | HEIGHT: 67 IN | BODY MASS INDEX: 30.29 KG/M2 | TEMPERATURE: 98.2 F | WEIGHT: 193 LBS | SYSTOLIC BLOOD PRESSURE: 124 MMHG

## 2024-10-03 DIAGNOSIS — R93.3 ABNORMAL COMPUTED TOMOGRAPHY OF CECUM AND TERMINAL ILEUM: ICD-10-CM

## 2024-10-03 DIAGNOSIS — Z83.79 FAMILY HISTORY OF CROHN'S DISEASE: ICD-10-CM

## 2024-10-03 DIAGNOSIS — R10.30 LOWER ABDOMINAL PAIN OF UNKNOWN ETIOLOGY: Primary | ICD-10-CM

## 2024-10-03 PROCEDURE — 99204 OFFICE O/P NEW MOD 45 MIN: CPT | Performed by: NURSE PRACTITIONER

## 2024-10-03 PROCEDURE — 3008F BODY MASS INDEX DOCD: CPT | Performed by: NURSE PRACTITIONER

## 2024-10-03 PROCEDURE — 99214 OFFICE O/P EST MOD 30 MIN: CPT | Performed by: NURSE PRACTITIONER

## 2024-10-03 NOTE — PROGRESS NOTES
Charles Gilmore is a 33 y.o. female with past medical history of asthma who is referred by Dr. Thuan Sanchez for lower abdominal pain. She had appendectomy 2023. Path showed acute appendicitis with serositis.     Now reports PLQ abdominal pain for several months, now more frequent. Occurring off an on, 3-5 times per week. Nausea but no vomiting. No changes in bowels. Tends towards constipation since childhood. Typically has BM 1-2 times per week. No blood in stool. Some bloating. Weight stable.    On 9/8/2024 presented to ER because pain was worse it has been, felt similar to prior appendicitis. No anemia. CT showed numerous gallstones, mild prominence of biliary tree. Slight prominence of terminal ileum without bowel wall thickening. Also some pelvic congestion, but worse on left side. No NSAIDS. No prior EGD or colonoscopy.    Social history: Works nightshift at [employer] and getting Bachelor's degree in [employer]. Never tobacco. Rare alcohol. Denies illicit drug use.    Family history: Maternal grandmother had colitis or Crohn's disease, unsure exact diagnosis. Denies family history of colon cancer or other GI disorders or malignancy.    Current Outpatient Medications   Medication Sig Dispense Refill    albuterol 90 mcg/actuation inhaler Inhale 2 puffs every 6 hours if needed for wheezing. 18 g 2    beclomethasone dipropionate (Qvar RediHaler) 80 mcg/actuation inhaler Inhale 2 Inhalations 2 times a day. Rinse mouth with water after use to reduce aftertaste and incidence of candidiasis. Do not swallow. 10.6 g 11    EluRyng 0.12-0.015 mg/24 hr vaginal ring Insert vaginally and leave in place for 3 consecutive weeks, Change every 3 weeks and leave out for 7 days every 12 weeks. 12 each 3    EPINEPHrine (Epipen) 0.3 mg/0.3 mL injection syringe Inject 0.3 mL (0.3 mg) into the muscle if needed for anaphylaxis. Call 911 after use. 2 each 1    fexofenadine (Allegra) 180 mg tablet Take 1 tablet (180 mg) by mouth once daily  "as needed (allergy symptoms). 30 tablet 11    ibuprofen 800 mg tablet Take 1 tablet (800 mg) by mouth every 8 hours if needed.      mometasone (Nasonex) 50 mcg/actuation nasal spray Administer 2 sprays into each nostril 2 times a day. 17 g 11    olopatadine (Pataday) 0.2 % ophthalmic solution Administer 1 drop into affected eye(s) once daily as needed for allergies (itchy eyes). 2.5 mL 5     No current facility-administered medications for this visit.       Review of Systems  Review of Systems negative except as noted in HPI.    Objective     /70   Pulse 71   Temp 36.8 °C (98.2 °F)   Ht 1.702 m (5' 7\")   Wt 87.5 kg (193 lb)   BMI 30.23 kg/m²      Physical Exam  Constitutional:  No acute distress. Normal appearance. Not ill-appearing.  HENT:  Head normocephalic and atraumatic. Conjunctivae normal.  Cardiovascular:  Normal rate. Regular rhythm.  Pulmonary:  Pulmonary effort normal. No respiratory distress. Breath sounds clear.  Abdominal:  Abdomen is soft. There is no distension. RLQ tenderness with palpation. There is also mild RUQ tenderness as well.   Skin: Dry.  Neurological:  Alert and oriented.  Psychiatric:  Mood and affect normal.    Assessment/Plan     33 y.o. female with history of asthma who presents today for initial clinic visit for several month history of RLQ abdominal pain. Imaging shows prominence of terminal ileum. Due to her pain and family history of Crohn's disease it is reasonable to proceed with colonoscopy to exclude IBD. She is agreeable.    She reports infrequent RUQ discomfort, not severe. She does have numerous gallstones on imaging. She will likely need cholecystectomy at some point however reasonable to defer this for now until above work-up complete.    Recommendations  Obtain baseline fecal calprotectin stool test.  Schedule colonoscopy. Discussed procedure and Miralax prep.  Follow up in January after procedure.    Electronically signed by: Rama Rodriguez CNP on 10/3/2024 at " 10:19 AM

## 2024-10-03 NOTE — PATIENT INSTRUCTIONS
Recommendations  Obtain baseline fecal calprotectin stool test.  Schedule colonoscopy. You can call [phone] to schedule. Make sure to ask for Miralax prep instructions.  Follow up in January. Please call the office at [phone] with any questions or concerns.

## 2024-10-17 ENCOUNTER — APPOINTMENT (OUTPATIENT)
Dept: OBSTETRICS AND GYNECOLOGY | Facility: CLINIC | Age: 34
End: 2024-10-17
Payer: COMMERCIAL

## 2024-10-17 VITALS — WEIGHT: 190 LBS | DIASTOLIC BLOOD PRESSURE: 72 MMHG | BODY MASS INDEX: 29.76 KG/M2 | SYSTOLIC BLOOD PRESSURE: 110 MMHG

## 2024-10-17 DIAGNOSIS — R10.2 PELVIC PAIN IN FEMALE: Primary | ICD-10-CM

## 2024-10-17 DIAGNOSIS — Z01.419 PAP TEST, AS PART OF ROUTINE GYNECOLOGICAL EXAMINATION: ICD-10-CM

## 2024-10-17 DIAGNOSIS — Z01.419 WOMEN'S ANNUAL ROUTINE GYNECOLOGICAL EXAMINATION: ICD-10-CM

## 2024-10-17 DIAGNOSIS — N94.89 PELVIC CONGESTION: ICD-10-CM

## 2024-10-17 DIAGNOSIS — Z11.3 SCREEN FOR STD (SEXUALLY TRANSMITTED DISEASE): ICD-10-CM

## 2024-10-17 PROCEDURE — 87624 HPV HI-RISK TYP POOLED RSLT: CPT

## 2024-10-17 PROCEDURE — 88142 CYTOPATH C/V THIN LAYER: CPT

## 2024-10-17 PROCEDURE — 87491 CHLMYD TRACH DNA AMP PROBE: CPT

## 2024-10-17 PROCEDURE — 87661 TRICHOMONAS VAGINALIS AMPLIF: CPT

## 2024-10-17 PROCEDURE — 87591 N.GONORRHOEAE DNA AMP PROB: CPT

## 2024-10-17 ASSESSMENT — ENCOUNTER SYMPTOMS
CARDIOVASCULAR NEGATIVE: 0
ENDOCRINE NEGATIVE: 0
EYES NEGATIVE: 0
NEUROLOGICAL NEGATIVE: 0
GASTROINTESTINAL NEGATIVE: 0
RESPIRATORY NEGATIVE: 0
MUSCULOSKELETAL NEGATIVE: 0
ALLERGIC/IMMUNOLOGIC NEGATIVE: 0
HEMATOLOGIC/LYMPHATIC NEGATIVE: 0
CONSTITUTIONAL NEGATIVE: 0
PSYCHIATRIC NEGATIVE: 0

## 2024-10-17 NOTE — PROGRESS NOTES
"Charles Gilmore \"Odilon" is a 34 y.o. female who is here for a routine exam. PCP = Tisha Arenas MD  Patient here for annual exam.  Has not been sexually active for over a year.  Does not have steady partner.  Discussed STD testing.  Chaperone present    Patient currently is under workup for chronic abdominal pelvic pain which has been going on for many months.  Unable to quantify.  Patient notes the pain is intermittent not every day.  6-10 out of 10 sharp cramping suprapubic bilateral lower quadrant and occasionally upper quadrant pain.  Denies nausea vomiting fever chills no GI or  complaints per se.  Patient did have CT scan during recent ER visit.  Chart reviewed.  CT scan did show gallstones.  Also showed dilated pelvic veins compatible pelvic congestion.  General surgery did not believe the pain was necessary due to the gallstones and did want her evaluated by GI and GYN    Review of Systems  Lower pelvic and upper abdominal pain.  Denies nausea vomiting fever chills no GI or  complaints.  All other systems negative    Physical Exam  Constitutional:       Appearance: Normal appearance. She is obese.   Genitourinary:      Genitourinary Comments: External genitalia unremarkable  Vagina clear  Cervix closed uterus normal anteverted  Adnexa masses or tenderness  Perineum without lesions or swelling    Breast without masses tenderness or nipple discharge, normal appearance   Breasts:     Breasts are soft.     Right: Normal.      Left: Normal.   HENT:      Head: Normocephalic.      Nose: Nose normal.   Eyes:      Pupils: Pupils are equal, round, and reactive to light.   Cardiovascular:      Rate and Rhythm: Normal rate and regular rhythm.   Pulmonary:      Effort: Pulmonary effort is normal.      Breath sounds: Normal breath sounds.   Abdominal:      General: Abdomen is flat. Bowel sounds are normal.      Palpations: Abdomen is soft.   Musculoskeletal:         General: Normal range of motion.      Cervical " back: Normal range of motion and neck supple.   Neurological:      General: No focal deficit present.      Mental Status: She is alert.   Skin:     General: Skin is warm and dry.   Psychiatric:         Mood and Affect: Mood normal.         Behavior: Behavior normal.         Thought Content: Thought content normal.         Judgment: Judgment normal.   Vitals reviewed.     Objective   There were no vitals taken for this visit.  OB History    No obstetric history on file.          GynHx:  Menarche/Menopause: 10  Periods are regular every 28-30 days, lasting 5 days.  Dysmenorrhea: none.   Cyclic symptoms include none.    Social History     Substance and Sexual Activity   Sexual Activity Not on file     Current contraception:  ring  STIs: none    Substance:   Tobacco Use: Low Risk  (10/17/2024)    Patient History     Smoking Tobacco Use: Never     Smokeless Tobacco Use: Never     Passive Exposure: Not on file      Social History     Substance and Sexual Activity   Drug Use Not Currently      Social History     Substance and Sexual Activity   Alcohol Use Yes     Abuse: No  Depression Screen:   Denies depression    Past med hx and past surg hx reviewed and notable for: Under workup for chronic abdominal pelvic pain, has seen general surgery due to history of gallstones.  Has appoint with GI    Assessment/Plan      Clinically unremarkable annual GYN exam.  Patient has not been sexually active in over a year.  Does not have steady partner routine STD testing ordered.  Pap obtained.  Discussed diet and exercise.  Currently is using NuvaRing for menstrual control and for possible contraception if needed is supposed be using it continuously but has been taking it out each month and having a cycle    Patient with multi month history of upper abdominal lower pelvic pain.  Patient is unable to specifically pin down when it started.  Patient notes the pain is 6-10 out of 10 intermittent variable with no inciting factors.  No  radiation noted.  No associated symptoms.  Patient chart reviewed.  CT scan reviewed.  Patient noted to have gallstones.  Was seen by general surgery.  Discussed doing cholecystectomy.  Wanted further workup from GI and GYN as symptoms are not typical.  CT scan also showed dilated veins compatible with pelvic congestion.  Reviewed findings today with patient.  Pelvic congestion syndrome is a very debated subject.  Whether or not a truly exists whether or not it is actually is a pain inducing syndrome is questionable.  Discussed possible treatments would include suppression of menses or hysterectomy in extreme cases.  At this point would recommend workup with GI first.  Diagnosed with possible GI issues which could also be causing her pain especially with the intermittent nature.  Patient will return to office in 4 to 6 weeks for reevaluation.  Discussed possible laparoscopy or robotic hysterectomy if no other options noted risks and benefits reviewed

## 2024-10-18 ENCOUNTER — LAB (OUTPATIENT)
Dept: LAB | Facility: LAB | Age: 34
End: 2024-10-18
Payer: COMMERCIAL

## 2024-10-18 DIAGNOSIS — Z83.79 FAMILY HISTORY OF CROHN'S DISEASE: ICD-10-CM

## 2024-10-18 DIAGNOSIS — R10.30 LOWER ABDOMINAL PAIN OF UNKNOWN ETIOLOGY: ICD-10-CM

## 2024-10-18 DIAGNOSIS — Z12.11 COLON CANCER SCREENING: Primary | ICD-10-CM

## 2024-10-18 DIAGNOSIS — R93.3 ABNORMAL COMPUTED TOMOGRAPHY OF CECUM AND TERMINAL ILEUM: ICD-10-CM

## 2024-10-18 LAB
C TRACH RRNA SPEC QL NAA+PROBE: NEGATIVE
N GONORRHOEA DNA SPEC QL PROBE+SIG AMP: NEGATIVE
T VAGINALIS RRNA SPEC QL NAA+PROBE: NEGATIVE

## 2024-10-18 PROCEDURE — 83993 ASSAY FOR CALPROTECTIN FECAL: CPT

## 2024-10-18 RX ORDER — POLYETHYLENE GLYCOL 3350, SODIUM CHLORIDE, SODIUM BICARBONATE, POTASSIUM CHLORIDE 420; 11.2; 5.72; 1.48 G/4L; G/4L; G/4L; G/4L
4000 POWDER, FOR SOLUTION ORAL ONCE
Qty: 4000 ML | Refills: 0 | Status: SHIPPED | OUTPATIENT
Start: 2024-10-18 | End: 2024-10-18

## 2024-10-23 LAB — CALPROTECTIN STL-MCNT: 7 UG/G

## 2024-10-28 LAB
CYTOLOGY CMNT CVX/VAG CYTO-IMP: NORMAL
HPV HR 12 DNA GENITAL QL NAA+PROBE: NEGATIVE
HPV HR GENOTYPES PNL CVX NAA+PROBE: NEGATIVE
HPV16 DNA SPEC QL NAA+PROBE: NEGATIVE
HPV18 DNA SPEC QL NAA+PROBE: NEGATIVE
LAB AP HPV GENOTYPE QUESTION: YES
LAB AP HPV HR: NORMAL
LAB AP PAP ADDITIONAL TESTS: NORMAL
LABORATORY COMMENT REPORT: NORMAL
LABORATORY COMMENT REPORT: NORMAL
PATH REPORT.TOTAL CANCER: NORMAL

## 2024-11-15 ENCOUNTER — APPOINTMENT (OUTPATIENT)
Dept: SURGERY | Facility: CLINIC | Age: 34
End: 2024-11-15
Payer: COMMERCIAL

## 2024-12-17 ENCOUNTER — ANESTHESIA (OUTPATIENT)
Dept: GASTROENTEROLOGY | Facility: HOSPITAL | Age: 34
End: 2024-12-17
Payer: COMMERCIAL

## 2024-12-17 ENCOUNTER — HOSPITAL ENCOUNTER (OUTPATIENT)
Dept: GASTROENTEROLOGY | Facility: HOSPITAL | Age: 34
Discharge: HOME | End: 2024-12-17
Payer: COMMERCIAL

## 2024-12-17 ENCOUNTER — ANESTHESIA EVENT (OUTPATIENT)
Dept: GASTROENTEROLOGY | Facility: HOSPITAL | Age: 34
End: 2024-12-17
Payer: COMMERCIAL

## 2024-12-17 VITALS
DIASTOLIC BLOOD PRESSURE: 78 MMHG | OXYGEN SATURATION: 100 % | SYSTOLIC BLOOD PRESSURE: 111 MMHG | BODY MASS INDEX: 30.29 KG/M2 | WEIGHT: 193 LBS | RESPIRATION RATE: 22 BRPM | TEMPERATURE: 97.7 F | HEART RATE: 59 BPM | HEIGHT: 67 IN

## 2024-12-17 DIAGNOSIS — Z83.79 FAMILY HISTORY OF CROHN'S DISEASE: ICD-10-CM

## 2024-12-17 DIAGNOSIS — R10.30 LOWER ABDOMINAL PAIN OF UNKNOWN ETIOLOGY: ICD-10-CM

## 2024-12-17 DIAGNOSIS — R93.3 ABNORMAL COMPUTED TOMOGRAPHY OF CECUM AND TERMINAL ILEUM: ICD-10-CM

## 2024-12-17 PROBLEM — R10.9 ABDOMINAL PAIN: Status: ACTIVE | Noted: 2024-09-11

## 2024-12-17 LAB — PREGNANCY TEST URINE, POC: NEGATIVE

## 2024-12-17 PROCEDURE — 7100000010 HC PHASE TWO TIME - EACH INCREMENTAL 1 MINUTE

## 2024-12-17 PROCEDURE — 3700000002 HC GENERAL ANESTHESIA TIME - EACH INCREMENTAL 1 MINUTE

## 2024-12-17 PROCEDURE — 45380 COLONOSCOPY AND BIOPSY: CPT | Performed by: STUDENT IN AN ORGANIZED HEALTH CARE EDUCATION/TRAINING PROGRAM

## 2024-12-17 PROCEDURE — A45380 PR COLONOSCOPY,BIOPSY

## 2024-12-17 PROCEDURE — 3700000001 HC GENERAL ANESTHESIA TIME - INITIAL BASE CHARGE

## 2024-12-17 PROCEDURE — 2500000004 HC RX 250 GENERAL PHARMACY W/ HCPCS (ALT 636 FOR OP/ED)

## 2024-12-17 PROCEDURE — 81025 URINE PREGNANCY TEST: CPT | Performed by: STUDENT IN AN ORGANIZED HEALTH CARE EDUCATION/TRAINING PROGRAM

## 2024-12-17 PROCEDURE — A45380 PR COLONOSCOPY,BIOPSY: Performed by: ANESTHESIOLOGY

## 2024-12-17 PROCEDURE — 7100000009 HC PHASE TWO TIME - INITIAL BASE CHARGE

## 2024-12-17 RX ORDER — LIDOCAINE HYDROCHLORIDE 10 MG/ML
0.1 INJECTION, SOLUTION EPIDURAL; INFILTRATION; INTRACAUDAL; PERINEURAL ONCE
OUTPATIENT
Start: 2024-12-17 | End: 2024-12-17

## 2024-12-17 RX ORDER — PROPOFOL 10 MG/ML
INJECTION, EMULSION INTRAVENOUS CONTINUOUS PRN
Status: DISCONTINUED | OUTPATIENT
Start: 2024-12-17 | End: 2024-12-17

## 2024-12-17 RX ORDER — SODIUM CHLORIDE, SODIUM LACTATE, POTASSIUM CHLORIDE, CALCIUM CHLORIDE 600; 310; 30; 20 MG/100ML; MG/100ML; MG/100ML; MG/100ML
100 INJECTION, SOLUTION INTRAVENOUS CONTINUOUS
OUTPATIENT
Start: 2024-12-17 | End: 2024-12-17

## 2024-12-17 RX ORDER — MIDAZOLAM HYDROCHLORIDE 1 MG/ML
INJECTION INTRAMUSCULAR; INTRAVENOUS AS NEEDED
Status: DISCONTINUED | OUTPATIENT
Start: 2024-12-17 | End: 2024-12-17

## 2024-12-17 RX ORDER — HYDROMORPHONE HYDROCHLORIDE 1 MG/ML
0.5 INJECTION, SOLUTION INTRAMUSCULAR; INTRAVENOUS; SUBCUTANEOUS EVERY 5 MIN PRN
OUTPATIENT
Start: 2024-12-17

## 2024-12-17 RX ORDER — HYDROMORPHONE HYDROCHLORIDE 1 MG/ML
0.2 INJECTION, SOLUTION INTRAMUSCULAR; INTRAVENOUS; SUBCUTANEOUS EVERY 5 MIN PRN
OUTPATIENT
Start: 2024-12-17

## 2024-12-17 SDOH — HEALTH STABILITY: MENTAL HEALTH: CURRENT SMOKER: 0

## 2024-12-17 ASSESSMENT — PAIN - FUNCTIONAL ASSESSMENT: PAIN_FUNCTIONAL_ASSESSMENT: 0-10

## 2024-12-17 ASSESSMENT — COLUMBIA-SUICIDE SEVERITY RATING SCALE - C-SSRS
1. IN THE PAST MONTH, HAVE YOU WISHED YOU WERE DEAD OR WISHED YOU COULD GO TO SLEEP AND NOT WAKE UP?: NO
1. IN THE PAST MONTH, HAVE YOU WISHED YOU WERE DEAD OR WISHED YOU COULD GO TO SLEEP AND NOT WAKE UP?: NO

## 2024-12-17 ASSESSMENT — PAIN SCALES - GENERAL: PAINLEVEL_OUTOF10: 0 - NO PAIN

## 2024-12-17 NOTE — ANESTHESIA POSTPROCEDURE EVALUATION
"Patient: Charles Gilmore \"Celina\"    Procedure Summary       Date: 12/17/24 Room / Location: [facility]    Anesthesia Start: 0914 Anesthesia Stop: 0955    Procedure: COLONOSCOPY Diagnosis:       Lower abdominal pain of unknown etiology      Abnormal computed tomography of cecum and terminal ileum      Family history of Crohn's disease    Scheduled Providers: Cornell Monte MD; Drew Powell DO Responsible Provider: Drew Powell DO    Anesthesia Type: general, MAC ASA Status: 3            Anesthesia Type: general, MAC    Vitals Value Taken Time   /78 12/17/24 1023   Temp 36.5 °C (97.7 °F) 12/17/24 0953   Pulse 59 12/17/24 1023   Resp 22 12/17/24 1023   SpO2 100 % 12/17/24 1023       Anesthesia Post Evaluation    Patient location during evaluation: PACU  Patient participation: complete - patient participated  Level of consciousness: awake  Pain management: adequate  Airway patency: patent  Cardiovascular status: acceptable  Respiratory status: acceptable  Hydration status: acceptable  Postoperative Nausea and Vomiting: none        There were no known notable events for this encounter.    "

## 2024-12-17 NOTE — H&P
"History Of Present Illness  Charles Gilmore \"Odilon" is a 34 y.o. female with recurrent episodes of abdominal pain and CT imaging showing slight prominence of TI, presenting for diagnostic colonoscopy.      Past Medical History  Past Medical History:   Diagnosis Date    Dysmenorrhea 05/02/2023     Surgical History  Past Surgical History:   Procedure Laterality Date    OTHER SURGICAL HISTORY  01/25/2022    Dermatological surgery     Social History  She reports that she has never smoked. She has never used smokeless tobacco. She reports current alcohol use. She reports that she does not currently use drugs.    Family History  Family History   Problem Relation Name Age of Onset    Hypertension Mother      No Known Problems Father      No Known Problems Sister      No Known Problems Sister      No Known Problems Brother      No Known Problems Son          Allergies  Allergies   Allergen Reactions    Avocado Anaphylaxis     Skin burning after she put it on her hair    Pineapple Anaphylaxis    Watermelon Anaphylaxis     Review of Systems   All other systems reviewed and are negative.       Physical Exam  Eyes:      General: No scleral icterus.     Pupils: Pupils are equal, round, and reactive to light.   Cardiovascular:      Rate and Rhythm: Normal rate and regular rhythm.      Pulses: Normal pulses.   Pulmonary:      Effort: Pulmonary effort is normal.   Abdominal:      General: Abdomen is flat. There is no distension.      Palpations: Abdomen is soft.      Tenderness: There is no abdominal tenderness.   Skin:     General: Skin is warm.   Neurological:      General: No focal deficit present.      Mental Status: She is alert and oriented to person, place, and time.   Psychiatric:         Mood and Affect: Mood normal.         Thought Content: Thought content normal.          Last Recorded Vitals  There were no vitals taken for this visit.    Assessment/Plan   Will proceed with diagnostic colonoscopy      Luis Enrique Villagomez MD  "

## 2024-12-17 NOTE — ANESTHESIA PREPROCEDURE EVALUATION
"Patient: Charles Gilmore \"Celina\"    Procedure Information       Date/Time: 12/17/24 0840    Scheduled providers: Cornell Monte MD; Drew Powell DO    Procedure: COLONOSCOPY    Location: [facility]            Relevant Problems   Pulmonary   (+) Mild intermittent asthma      Liver   (+) Gall bladder stones       Clinical information reviewed:    Allergies  Meds               NPO Detail:  NPO/Void Status  Date of Last Liquid: 12/17/24  Time of Last Liquid: 0730  Date of Last Solid: 12/15/24  Time of Last Solid: 0400  Last Intake Type: Clear fluids; Food; GI prep  Time of Last Void: 0755         Physical Exam    Airway  Mallampati: II  TM distance: >3 FB  Neck ROM: full     Cardiovascular - normal exam  Rhythm: regular  Rate: normal     Dental        Pulmonary - normal exam  Breath sounds clear to auscultation     Abdominal            Anesthesia Plan    History of general anesthesia?: yes  History of complications of general anesthesia?: no    ASA 3     general and MAC     The patient is not a current smoker.  Patient did not smoke on day of procedure.    intravenous induction   Anesthetic plan and risks discussed with patient.    Plan discussed with attending.      "

## 2024-12-24 LAB
LABORATORY COMMENT REPORT: NORMAL
PATH REPORT.FINAL DX SPEC: NORMAL
PATH REPORT.GROSS SPEC: NORMAL
PATH REPORT.MICROSCOPIC SPEC OTHER STN: NORMAL
PATH REPORT.RELEVANT HX SPEC: NORMAL
PATH REPORT.TOTAL CANCER: NORMAL

## 2025-01-08 NOTE — PROGRESS NOTES
Charles Gilmore is a 34 y.o. female with past medical history of asthma who presents today for follow up of lower abdominal pain. Appendectomy 2023. Path showed acute appendicitis with serositis.     Seen 10/2024 with several months of RLQ abdominal pain, 3-5 times per week. Tends towards constipation since childhood with BM 1-2 times per week. On 9/8/2024 had presented to ER as pain was worst it had been, felt similar to prior appendicitis. No anemia. CT showed numerous gallstones, slight prominence of terminal ileum without bowel wall thickening. No NSAIDS.     Fecal calprotectin 7. 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 Random colon biopsy normal.    Presents today for follow up. Continues to have episodes of RLQ pain. May go a few weeks without pain and then will occur again. Last severe episode occurred after eating an apple. Has also occurred with chick peas. No blood in stool, nausea, vomiting, or weight loss.     Social history: Works nightshift at [employer] and getting Bachelor's degree in [employer]. Never tobacco. Rare alcohol. Denies illicit drug use.     Family history: Maternal grandmother had colitis or Crohn's disease, unsure exact diagnosis. Denies family history of colon cancer or other GI disorders or malignancy.       Current Outpatient Medications   Medication Sig Dispense Refill    albuterol 90 mcg/actuation inhaler Inhale 2 puffs every 6 hours if needed for wheezing. 18 g 2    beclomethasone dipropionate (Qvar RediHaler) 80 mcg/actuation inhaler Inhale 2 Inhalations 2 times a day. Rinse mouth with water after use to reduce aftertaste and incidence of candidiasis. Do not swallow. 10.6 g 11    EluRyng 0.12-0.015 mg/24 hr vaginal ring Insert vaginally and leave in place for 3 consecutive weeks, Change every 3 weeks and leave out for 7 days every 12 weeks. 12 each 3    EPINEPHrine (Epipen) 0.3 mg/0.3 mL injection syringe Inject 0.3 mL (0.3 mg) into the muscle  "if needed for anaphylaxis. Call 911 after use. 2 each 1    fexofenadine (Allegra) 180 mg tablet Take 1 tablet (180 mg) by mouth once daily as needed (allergy symptoms). 30 tablet 11    hyoscyamine 0.125 mg SL tablet Place 1 tablet (0.125 mg) under the tongue every 8 hours if needed.      ibuprofen 800 mg tablet Take 1 tablet (800 mg) by mouth every 8 hours if needed.      ketorolac (Toradol) 10 mg tablet Take 1 tablet (10 mg) by mouth 3 times a day.      mometasone (Nasonex) 50 mcg/actuation nasal spray Administer 2 sprays into each nostril 2 times a day. 17 g 11    olopatadine (Pataday) 0.2 % ophthalmic solution Administer 1 drop into affected eye(s) once daily as needed for allergies (itchy eyes). 2.5 mL 5     No current facility-administered medications for this visit.       Review of Systems  Review of Systems negative except as noted in HPI.    Objective     /76   Pulse 79   Temp 36.7 °C (98.1 °F) (Temporal)   Ht 1.702 m (5' 7\")   Wt 84.8 kg (187 lb)   BMI 29.29 kg/m²      Physical Exam  Constitutional:  No acute distress. Normal appearance. Not ill-appearing.  HENT:  Head normocephalic and atraumatic. Conjunctivae normal.  Cardiovascular:  Normal rate. Regular rhythm.  Pulmonary:  Pulmonary effort normal. No respiratory distress. Breath sounds clear.  Abdominal:  Abdomen is flat and soft. There is no distension. No tenderness or guarding.  Skin: Dry.  Neurological:  Alert and oriented.  Psychiatric:  Mood and affect normal.    Assessment/Plan     33 y.o. female with history of asthma who presents today for clinic follow up of intermittent RLQ pain. Imaging showed prominence of terminal ileum. Recent colonoscopy with ileal nodular lymphoid hyperplasia. This is a benign condition. NLH is often asymptomatic, but in some people can cause abdominal pain, along with other gastrointestinal symptoms like diarrhea, bleeding.     Currently, her RLQ pain episodes are pretty infrequent, although they are severe " when they do occur. Roughage in diet seems to be a trigger. She will avoid these foods (such as apples). Start Levsin as needed.    We will also check for IgA deficiency as this can occur with NLH. If low will need repeat to confirm.    If her pain were to become very frequent she may need to consider discussing elective resection, although I would not recommend this currently.     Intermittent RUQ discomfort - Not severe. Numerous gallstones on imaging. She may require cholecystectomy at some point in time however reasonable to defer for now as this is pretty infrequent.    Follow up as needed.    Electronically signed by: Rama Rodriguez CNP on 1/16/2025 at 11:19 AM

## 2025-01-16 ENCOUNTER — LAB (OUTPATIENT)
Dept: LAB | Facility: LAB | Age: 35
End: 2025-01-16
Payer: COMMERCIAL

## 2025-01-16 ENCOUNTER — OFFICE VISIT (OUTPATIENT)
Dept: GASTROENTEROLOGY | Facility: CLINIC | Age: 35
End: 2025-01-16
Payer: COMMERCIAL

## 2025-01-16 VITALS
SYSTOLIC BLOOD PRESSURE: 127 MMHG | TEMPERATURE: 98.1 F | HEART RATE: 79 BPM | DIASTOLIC BLOOD PRESSURE: 76 MMHG | HEIGHT: 67 IN | BODY MASS INDEX: 29.35 KG/M2 | WEIGHT: 187 LBS

## 2025-01-16 DIAGNOSIS — R93.3 ABNORMAL COMPUTED TOMOGRAPHY OF CECUM AND TERMINAL ILEUM: ICD-10-CM

## 2025-01-16 DIAGNOSIS — R10.31 RLQ ABDOMINAL PAIN: Primary | ICD-10-CM

## 2025-01-16 DIAGNOSIS — R10.31 RLQ ABDOMINAL PAIN: ICD-10-CM

## 2025-01-16 LAB — IGA SERPL-MCNC: 141 MG/DL (ref 70–400)

## 2025-01-16 PROCEDURE — 82784 ASSAY IGA/IGD/IGG/IGM EACH: CPT

## 2025-01-16 PROCEDURE — 99214 OFFICE O/P EST MOD 30 MIN: CPT | Performed by: NURSE PRACTITIONER

## 2025-01-16 PROCEDURE — 1036F TOBACCO NON-USER: CPT | Performed by: NURSE PRACTITIONER

## 2025-01-16 PROCEDURE — 3008F BODY MASS INDEX DOCD: CPT | Performed by: NURSE PRACTITIONER

## 2025-01-16 RX ORDER — KETOROLAC TROMETHAMINE 10 MG/1
10 TABLET, FILM COATED ORAL 3 TIMES DAILY
COMMUNITY
Start: 2024-11-27

## 2025-01-16 RX ORDER — HYOSCYAMINE SULFATE 0.12 MG/1
0.12 TABLET SUBLINGUAL EVERY 8 HOURS PRN
COMMUNITY
Start: 2024-11-27

## 2025-01-16 ASSESSMENT — PAIN SCALES - GENERAL: PAINLEVEL_OUTOF10: 0-NO PAIN

## 2025-01-23 ENCOUNTER — APPOINTMENT (OUTPATIENT)
Dept: OBSTETRICS AND GYNECOLOGY | Facility: CLINIC | Age: 35
End: 2025-01-23
Payer: COMMERCIAL

## 2025-02-06 ENCOUNTER — APPOINTMENT (OUTPATIENT)
Dept: PRIMARY CARE | Facility: CLINIC | Age: 35
End: 2025-02-06
Payer: COMMERCIAL

## 2025-02-06 VITALS
WEIGHT: 191.6 LBS | SYSTOLIC BLOOD PRESSURE: 122 MMHG | BODY MASS INDEX: 30.79 KG/M2 | HEART RATE: 72 BPM | OXYGEN SATURATION: 99 % | HEIGHT: 66 IN | DIASTOLIC BLOOD PRESSURE: 70 MMHG

## 2025-02-06 DIAGNOSIS — J45.30 MILD PERSISTENT ASTHMA WITHOUT COMPLICATION (HHS-HCC): ICD-10-CM

## 2025-02-06 DIAGNOSIS — Z00.00 WELL ADULT EXAM: Primary | ICD-10-CM

## 2025-02-06 DIAGNOSIS — Z91.018 MULTIPLE FOOD ALLERGIES: ICD-10-CM

## 2025-02-06 PROCEDURE — 90471 IMMUNIZATION ADMIN: CPT | Performed by: FAMILY MEDICINE

## 2025-02-06 PROCEDURE — 99395 PREV VISIT EST AGE 18-39: CPT | Performed by: FAMILY MEDICINE

## 2025-02-06 PROCEDURE — 1036F TOBACCO NON-USER: CPT | Performed by: FAMILY MEDICINE

## 2025-02-06 PROCEDURE — 3008F BODY MASS INDEX DOCD: CPT | Performed by: FAMILY MEDICINE

## 2025-02-06 PROCEDURE — 90715 TDAP VACCINE 7 YRS/> IM: CPT | Performed by: FAMILY MEDICINE

## 2025-02-06 RX ORDER — ALBUTEROL SULFATE 90 UG/1
2 INHALANT RESPIRATORY (INHALATION) EVERY 6 HOURS PRN
Qty: 18 G | Refills: 3 | Status: SHIPPED | OUTPATIENT
Start: 2025-02-06

## 2025-02-06 RX ORDER — BECLOMETHASONE DIPROPIONATE HFA 80 UG/1
160 AEROSOL, METERED RESPIRATORY (INHALATION) 2 TIMES DAILY
Qty: 10.6 G | Refills: 11 | Status: SHIPPED | OUTPATIENT
Start: 2025-02-06

## 2025-02-06 RX ORDER — ETONOGESTREL AND ETHINYL ESTRADIOL VAGINAL RING .015; .12 MG/D; MG/D
RING VAGINAL
Qty: 4 EACH | Refills: 3 | Status: SHIPPED | OUTPATIENT
Start: 2025-02-06

## 2025-02-06 RX ORDER — EPINEPHRINE 0.3 MG/.3ML
1 INJECTION SUBCUTANEOUS AS NEEDED
Qty: 2 EACH | Refills: 1 | Status: SHIPPED | OUTPATIENT
Start: 2025-02-06 | End: 2026-02-06

## 2025-02-06 ASSESSMENT — PATIENT HEALTH QUESTIONNAIRE - PHQ9
2. FEELING DOWN, DEPRESSED OR HOPELESS: SEVERAL DAYS
10. IF YOU CHECKED OFF ANY PROBLEMS, HOW DIFFICULT HAVE THESE PROBLEMS MADE IT FOR YOU TO DO YOUR WORK, TAKE CARE OF THINGS AT HOME, OR GET ALONG WITH OTHER PEOPLE: SOMEWHAT DIFFICULT
1. LITTLE INTEREST OR PLEASURE IN DOING THINGS: SEVERAL DAYS
SUM OF ALL RESPONSES TO PHQ9 QUESTIONS 1 AND 2: 2

## 2025-02-06 NOTE — PROGRESS NOTES
"  Subjective   Patient ID: Charles Gilmore \"Odilon" is a 34 y.o. female who presents for Annual Exam (Annual physical, med refills. Follows with OBGYN for PAPS. ).      Past Medical, Surgical, Social and Family Hx reviewed-Yes    Any acute complaints?    Some GI sxs for over half a year now, had colonoscopy and following with GI.  She also has gallstones but the specialists has said she did not need a cholecystectomy.  The only thing that has helped is limiting her diet to avoid dairy, certain fruits, etc.  She is not sure what all of her food sensitivities might be though.      Any chronic issues addressed today or Rx refills needed?    Asthma controlled with current meds  Happy with current birth control when she can get it.  Needs new Epipen    Last pap 2024  Last Mammogram N/a  Colon CA screening Hx N/A but had cscope last year  Labs 2023 wellness labs were great  Up to date on immunizations no, declines flu, but will do TDaP  Dentist in the past year yes    Menstruation no concerns   Sexual Activity not currently        PE:  /70   Pulse 72   Ht 1.676 m (5' 6\")   Wt 86.9 kg (191 lb 9.6 oz)   LMP 01/12/2025   SpO2 99%   BMI 30.93 kg/m²   Alert adult woman, NAD  HEENT clear  Neck supple, No LAD  Heart RRR no murmur  Lungs CTA bilat  Abdomen benign, normal BS, soft NT/ND  Skin warm, dry, clear  Neuro grossly normal, gait WNL  Affect pleasant and appropriate, memory and judgement WNL      Assessment/Plan   Assessment & Plan  Well adult exam  Reviewed HM and vaccines   TDaP today    Orders:    etonogestreL-ethinyl estradioL (EluRyng) 0.12-0.015 mg/24 hr vaginal ring; Insert vaginally and leave in place for 3 consecutive weeks, Change every 3 weeks and leave out for 7 days every 12 weeks.    Multiple food allergies    Orders:    EPINEPHrine (Epipen) 0.3 mg/0.3 mL injection syringe; Inject 0.3 mL (0.3 mg) into the muscle if needed for anaphylaxis. Call 911 after use.    Mild persistent asthma without " complication ([facility])    Orders:    beclomethasone dipropionate (Qvar RediHaler) 80 mcg/actuation inhaler; Inhale 2 Inhalations 2 times a day. Rinse mouth with water after use to reduce aftertaste and incidence of candidiasis. Do not swallow.    albuterol 90 mcg/actuation inhaler; Inhale 2 puffs every 6 hours if needed for wheezing.

## 2025-06-21 ENCOUNTER — OFFICE VISIT (OUTPATIENT)
Dept: URGENT CARE | Age: 35
End: 2025-06-21
Payer: COMMERCIAL

## 2025-06-21 VITALS
OXYGEN SATURATION: 99 % | WEIGHT: 191 LBS | BODY MASS INDEX: 29.98 KG/M2 | DIASTOLIC BLOOD PRESSURE: 83 MMHG | TEMPERATURE: 98.3 F | HEART RATE: 88 BPM | RESPIRATION RATE: 18 BRPM | SYSTOLIC BLOOD PRESSURE: 118 MMHG | HEIGHT: 67 IN

## 2025-06-21 DIAGNOSIS — N76.0 ACUTE VAGINITIS: Primary | ICD-10-CM

## 2025-06-21 LAB
POC APPEARANCE, URINE: CLEAR
POC BILIRUBIN, URINE: NEGATIVE
POC BLOOD, URINE: NEGATIVE
POC COLOR, URINE: YELLOW
POC GLUCOSE, URINE: NEGATIVE MG/DL
POC KETONES, URINE: NEGATIVE MG/DL
POC LEUKOCYTES, URINE: ABNORMAL
POC NITRITE,URINE: NEGATIVE
POC PH, URINE: 6 PH
POC PROTEIN, URINE: NEGATIVE MG/DL
POC SPECIFIC GRAVITY, URINE: 1.01
POC UROBILINOGEN, URINE: 0.2 EU/DL
PREGNANCY TEST URINE, POC: NEGATIVE

## 2025-06-21 RX ORDER — FLUCONAZOLE 150 MG/1
150 TABLET ORAL ONCE
Qty: 1 TABLET | Refills: 0 | Status: SHIPPED | OUTPATIENT
Start: 2025-06-21 | End: 2025-06-21

## 2025-06-21 RX ORDER — FLUCONAZOLE 150 MG/1
150 TABLET ORAL ONCE
Qty: 2 TABLET | Refills: 0 | Status: SHIPPED | OUTPATIENT
Start: 2025-06-21 | End: 2025-06-21

## 2025-06-21 NOTE — PATIENT INSTRUCTIONS
"Vaginitis in adults    The Basics  Written by the doctors and editors at [facility]  What is vaginitis?  Vaginitis is when the vagina and vulva become red and swollen (figure 1). (The vulva is the area around the opening of the vagina.) It is sometimes called \"vulvovaginitis.\"  Many different things can cause vaginitis. These include:  ? Infection - There are 3 main infections that can cause vaginitis in adults. These are bacterial vaginosis, \"Candida\" or \"yeast \"infection, and trichomoniasis. Other types of infections can also cause vaginal discharge and irritation. These include some sexually transmitted infections (\"STIs\").  ? Skin irritation - This can be caused by soaps, bubble bath, douches, detergents, perfumes, toilet paper, or sanitary pads.  ? Hormone changes - For example, these can be related to puberty, pregnancy, or menopause.  ? Certain medicines - Examples include antibiotics or medicines that weaken the immune system.  ? Certain health problems - Some medical conditions increase the risk of infection. These include uncontrolled diabetes or HIV infection.  What are the symptoms of vaginitis?  Symptoms of vaginitis include:  ? A change in color, odor, or amount of vaginal discharge  ? Itching or irritation in or around the vagina  ? Redness, swelling, or cracks in the skin around the vagina  ? Pain during sex  Should I see a doctor or nurse?  Yes. If you have the symptoms listed above, see a doctor or nurse.  If possible, avoid using medicines that go in your vagina for a day or 2 before your appointment. If you have recently used vaginal medicine, it can be harder for the doctor or nurse to find the cause of abnormal discharge.  Will I need tests?  Probably. First, your doctor or nurse will ask questions and do an exam. They will often do tests to check for infection.  If tests are needed, they might include:  ? pH and microscopy - The doctor or nurse looks at a sample of vaginal discharge under a " "microscope and checks the \"pH level.\" This is done in the doctor's office.  ? Lab tests - The doctor or nurse collects a sample of vaginal discharge. Then, the sample is sent to a lab for testing. These tests can show if you have an infection and, if so, what type.  ? STI tests - These include tests to check for gonorrhea, chlamydia, and trichomoniasis.  Some pH test kits are sold over the counter for use at home. But these are not recommended. That's because they only check the pH and cannot show the cause of infection.  How is vaginitis treated?  Treatment depends on what is causing the vaginitis. It might include:  ? Medicines - Medicines are mainly used to treat vaginitis caused by infections. These include pills that you take by mouth, creams or gels that you put in your vagina, or \"suppositories.\" Suppositories are tablets that are inserted into the vagina to dissolve.  ? Avoiding causes of irritation - For example, if the vaginitis was caused by using bubble bath, avoiding bubble bath will help.  Can vaginitis be prevented?  To lower your chances of getting vaginitis, you can:  ? Get out of wet clothing quickly - Change out of wet swimsuits or gym clothes as soon as you can. Avoid fabrics that hold moisture, like nylon or polyester.  ? Wear loose-fitting clothing - Tight clothing, such as pantyhose, can trap moisture and make infection more likely.  ? Clean the area around the vagina with water - Rinse the area with water only. If you must use soap, choose a mild soap and rinse well. Do not use bubble bath or a douche. Do not use perfume or other fragrance sprays around the vagina.  ? Practice good genital hygiene - Wipe from front to back after using the toilet, and urinate after you have sex.  ? Use condoms during sex - This can lower your chances of getting bacterial vaginosis or an STI.  What problems should I watch for?  If you have been diagnosed with vaginitis, call for advice if:  ? Your symptoms are " not getting better or are getting worse after treatment.  ? Your vaginitis comes back after getting better.  All topics are updated as new evidence becomes available and our peer review process is complete.

## 2025-06-21 NOTE — PROGRESS NOTES
"Subjective   Patient ID: Charles Gilmore \"Odilon" is a 34 y.o. female. They present today with a chief complaint of Urinary Problem (Pt has vaginal discomfort vaginal irritation 1 day ).    History of Present Illness  HPI  This is a 34-year-old female presents urgent care for vaginal irritation.  Patient states her symptoms started 1 day ago.  She is finishing her menstrual period.  She has vaginal discomfort, irritation, and noticed a whitish discharge this morning.  Would like to be tested for STDs as well.  Denies abdominal/flank/back pain.  Denies concern for pregnancy.  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 She has never used smokeless tobacco. She reports current alcohol use. She reports that she does not currently use drugs.    Review of Systems  Review of Systems   All other systems reviewed and are negative.                                 Objective    Vitals:    06/21/25 1258   BP: 118/83   BP Location: Left arm   Patient Position: Sitting   BP Cuff Size: Adult   Pulse: 88   Resp: 18   Temp: 36.8 °C (98.3 °F)   TempSrc: Oral   SpO2: 99%   Weight: 86.6 kg (191 lb)   Height: 1.702 m (5' 7\")     Patient's last menstrual period was 06/13/2025 (exact date).    Physical Exam  Physical Exam    General: Vitals noted, no distress. Afebrile.    Cardiac: Regular, rate, rhythm    Pulmonary: Lungs clear bilaterally with good aeration. No adventitious breath sounds.    Abdomen: Soft, nonsurgical. Nontender. No peritoneal signs. Normoactive bowel sounds.    : Deferred    Extremities: No peripheral edema.    Skin: No rash.    Neuro: No gross neurological deficits.  Procedures    Point of Care Test & Imaging Results from this visit  No " results found for this visit on 06/21/25.   Imaging  No results found.    Cardiology, Vascular, and Other Imaging  No other imaging results found for the past 2 days      Diagnostic study results (if any) were reviewed by Michael Rodriguez PA-C.    Assessment/Plan   Allergies, medications, history, and pertinent labs/EKGs/Imaging reviewed by Michael Rodriguez PA-C.     Medical Decision Making  MDM:      Summary: This is a 34-year-old female here for vaginal complaint. Vital signs were reviewed. Patient is well-appearing nontoxic on exam. Differential diagnosis includes but not limited to STDs, BV, yeast, pregnancy.  Urine Pressey test is negative.  Urine dip demonstrates +/- leukocytes but no signs of infection.  She is not having any urinary symptoms.  I am not sending this for urine culture.  Swabs were sent to the lab.  She has had a yeast infection before and I will treat her for this.  Further treatment pending swab results.  Stable for discharge. Follow-up with PCP. Return to urgent care or go to the emergency department if symptoms worsen or if new symptoms develop.    Orders and Diagnoses  Diagnoses and all orders for this visit:  Acute vaginitis  -     POCT UA Automated manually resulted  -     POCT pregnancy, urine manually resulted  -     fluconazole (Diflucan) 150 mg tablet; Take 1 tablet (150 mg) by mouth 1 time for 1 dose.  -     Trichomonas vaginalis, Amplified  -     C. trachomatis / N. gonorrhoeae, Amplified, Urogenital  -     Vaginitis Gram Stain For Bacterial Vaginosis + Yeast      Medical Admin Record      Patient disposition: Home    Electronically signed by Michael Rodriguez PA-C  1:19 PM           [1] (Not in a hospital admission)  [2]   Past Medical History:  Diagnosis Date    Dysmenorrhea 05/02/2023   [3]   Past Surgical History:  Procedure Laterality Date    OTHER SURGICAL HISTORY  01/25/2022    Dermatological surgery

## 2025-06-22 LAB
BV SCORE VAG QL: NORMAL
C TRACH RRNA SPEC QL NAA+PROBE: NOT DETECTED
N GONORRHOEA RRNA SPEC QL NAA+PROBE: NOT DETECTED
QUEST GC CT AMPLIFIED (ALWAYS MESSAGE): NORMAL
T VAGINALIS RRNA SPEC QL NAA+PROBE: NOT DETECTED